# Patient Record
Sex: FEMALE | URBAN - METROPOLITAN AREA
[De-identification: names, ages, dates, MRNs, and addresses within clinical notes are randomized per-mention and may not be internally consistent; named-entity substitution may affect disease eponyms.]

---

## 2020-07-28 ENCOUNTER — APPOINTMENT (RX ONLY)
Dept: URBAN - METROPOLITAN AREA CLINIC 34 | Facility: CLINIC | Age: 35
Setting detail: DERMATOLOGY
End: 2020-07-28

## 2020-07-28 DIAGNOSIS — L65.9 NONSCARRING HAIR LOSS, UNSPECIFIED: ICD-10-CM

## 2020-07-28 DIAGNOSIS — Z41.9 ENCOUNTER FOR PROCEDURE FOR PURPOSES OTHER THAN REMEDYING HEALTH STATE, UNSPECIFIED: ICD-10-CM

## 2020-07-28 PROCEDURE — ? COSMETIC CONSULTATION: LHR

## 2020-07-28 PROCEDURE — 99202 OFFICE O/P NEW SF 15 MIN: CPT

## 2020-07-28 PROCEDURE — ? COUNSELING

## 2020-07-28 PROCEDURE — ? ORDER TESTS

## 2020-07-28 PROCEDURE — ? PRESCRIPTION

## 2020-07-28 PROCEDURE — ? ADDITIONAL NOTES

## 2020-07-28 RX ORDER — EFLORNITHINE HYDROCHLORIDE 139 MG/G
CREAM TOPICAL BID
Qty: 1 | Refills: 1 | Status: ERX | COMMUNITY
Start: 2020-07-28

## 2020-07-28 RX ORDER — KETOCONAZOLE 20 MG/ML
SHAMPOO, SUSPENSION TOPICAL
Qty: 1 | Refills: 2 | Status: ERX | COMMUNITY
Start: 2020-07-28

## 2020-07-28 RX ADMIN — EFLORNITHINE HYDROCHLORIDE: 139 CREAM TOPICAL at 00:00

## 2020-07-28 RX ADMIN — KETOCONAZOLE: 20 SHAMPOO, SUSPENSION TOPICAL at 00:00

## 2020-07-28 ASSESSMENT — LOCATION DETAILED DESCRIPTION DERM
LOCATION DETAILED: RIGHT LOWER CUTANEOUS LIP
LOCATION DETAILED: LEFT UPPER CUTANEOUS LIP

## 2020-07-28 ASSESSMENT — LOCATION SIMPLE DESCRIPTION DERM
LOCATION SIMPLE: LEFT LIP
LOCATION SIMPLE: RIGHT LIP

## 2020-07-28 ASSESSMENT — LOCATION ZONE DERM: LOCATION ZONE: LIP

## 2020-07-28 NOTE — PROCEDURE: ADDITIONAL NOTES
Detail Level: Simple
Additional Notes: Patient notes she tried OTC Rogaine with no improvement.\\nPatient denies current or history of  itching and pain on her scalp. Her father has receding hair loss and her mother has difficulty growing hair long.\\nPatient says her hair is thick, but it won’t grow past a certain length

## 2020-07-28 NOTE — HPI: HAIR LOSS
Previous Labs: No
How Did The Hair Loss Occur?: gradual in onset
How Severe Is Your Hair Loss?: moderate
What Hair Products Do You Use?: Brandin clarifying shampoo
Additional History: Patient notes she doesn’t notice hair loss but rather hair won’t grow.

## 2020-10-08 ENCOUNTER — RX ONLY (OUTPATIENT)
Age: 35
Setting detail: RX ONLY
End: 2020-10-08

## 2020-10-08 ENCOUNTER — APPOINTMENT (RX ONLY)
Dept: URBAN - METROPOLITAN AREA CLINIC 34 | Facility: CLINIC | Age: 35
Setting detail: DERMATOLOGY
End: 2020-10-08

## 2020-10-08 DIAGNOSIS — L74.51 PRIMARY FOCAL HYPERHIDROSIS: ICD-10-CM

## 2020-10-08 DIAGNOSIS — L65.9 NONSCARRING HAIR LOSS, UNSPECIFIED: ICD-10-CM

## 2020-10-08 PROBLEM — L74.513 PRIMARY FOCAL HYPERHIDROSIS, SOLES: Status: ACTIVE | Noted: 2020-10-08

## 2020-10-08 PROBLEM — L74.512 PRIMARY FOCAL HYPERHIDROSIS, PALMS: Status: ACTIVE | Noted: 2020-10-08

## 2020-10-08 PROCEDURE — 99213 OFFICE O/P EST LOW 20 MIN: CPT

## 2020-10-08 PROCEDURE — ? PRESCRIPTION

## 2020-10-08 PROCEDURE — ? COUNSELING

## 2020-10-08 PROCEDURE — ? ADDITIONAL NOTES

## 2020-10-08 RX ORDER — GLYCOPYRROLATE 1 MG/1
TABLET ORAL
Qty: 120 | Refills: 1 | Status: ERX | COMMUNITY
Start: 2020-10-08

## 2020-10-08 RX ORDER — KETOCONAZOLE 20 MG/ML
SHAMPOO, SUSPENSION TOPICAL
Qty: 1 | Refills: 2 | Status: ERX

## 2020-10-08 RX ADMIN — GLYCOPYRROLATE: 1 TABLET ORAL at 00:00

## 2020-10-08 ASSESSMENT — LOCATION SIMPLE DESCRIPTION DERM
LOCATION SIMPLE: RIGHT INDEX FINGER
LOCATION SIMPLE: LEFT HAND
LOCATION SIMPLE: RIGHT PLANTAR SURFACE
LOCATION SIMPLE: LEFT PLANTAR SURFACE

## 2020-10-08 ASSESSMENT — LOCATION ZONE DERM
LOCATION ZONE: HAND
LOCATION ZONE: FEET
LOCATION ZONE: FINGER

## 2020-10-08 ASSESSMENT — LOCATION DETAILED DESCRIPTION DERM
LOCATION DETAILED: RIGHT PROXIMAL PALMAR INDEX FINGER
LOCATION DETAILED: LEFT MEDIAL PLANTAR MIDFOOT
LOCATION DETAILED: LEFT ULNAR PALM
LOCATION DETAILED: RIGHT MEDIAL PLANTAR MIDFOOT

## 2020-10-08 NOTE — PROCEDURE: ADDITIONAL NOTES
Detail Level: Simple
Additional Notes: Patient is following up with pcp for thyroid abnormality. Gayle notes overall improvement in hair growth. Patient t says she noticed a small amount of hair growth. Patient says she is concerned about hair length, not hair thickness.

## 2021-04-12 ENCOUNTER — APPOINTMENT (RX ONLY)
Dept: URBAN - METROPOLITAN AREA CLINIC 34 | Facility: CLINIC | Age: 36
Setting detail: DERMATOLOGY
End: 2021-04-12

## 2021-04-12 DIAGNOSIS — F45.8 OTHER SOMATOFORM DISORDERS: ICD-10-CM

## 2021-04-12 PROCEDURE — 99213 OFFICE O/P EST LOW 20 MIN: CPT

## 2021-04-12 PROCEDURE — ? COUNSELING

## 2021-04-12 PROCEDURE — ? ADDITIONAL NOTES

## 2021-04-12 ASSESSMENT — LOCATION ZONE DERM
LOCATION ZONE: ARM
LOCATION ZONE: TRUNK

## 2021-04-12 ASSESSMENT — LOCATION DETAILED DESCRIPTION DERM
LOCATION DETAILED: LEFT INFERIOR MEDIAL UPPER BACK
LOCATION DETAILED: LEFT ANTECUBITAL SKIN
LOCATION DETAILED: UPPER STERNUM
LOCATION DETAILED: RIGHT VENTRAL PROXIMAL FOREARM

## 2021-04-12 ASSESSMENT — LOCATION SIMPLE DESCRIPTION DERM
LOCATION SIMPLE: LEFT UPPER ARM
LOCATION SIMPLE: CHEST
LOCATION SIMPLE: LEFT UPPER BACK
LOCATION SIMPLE: RIGHT FOREARM

## 2021-04-12 NOTE — PROCEDURE: ADDITIONAL NOTES
Detail Level: Simple
Additional Notes: Patient notes she feels bugs jumping/crawling on skin and patient has been itching for a month. Patient skin is clear. 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  Given patient on several anti psych meds would  her to discuss this with her psychiatrist as changing her meds could improve symptoms.   She cannot tolerate risperdal.  Could consider pimozide.   Already on Seroquel, tramadol, and buspar. \\n\\nAdvised can take oral antihistamines and continue otc cortisone cream. Patient reports she showers twice daily. Advised to reduce frequency to once a day as it can induce itching and contribute to this feeling.  No scrubbing with loofa or wash cloth.  \\n\\nIf lesions appear, advised to return to office for reevaluation.
Render Risk Assessment In Note?: no

## 2021-04-12 NOTE — HPI: RASH (ECZEMA)
How Severe Is Your Eczema?: moderate
Is This A New Presentation, Or A Follow-Up?: Rash
Additional History: Bedbugs

## 2021-05-06 ENCOUNTER — APPOINTMENT (RX ONLY)
Dept: URBAN - METROPOLITAN AREA CLINIC 34 | Facility: CLINIC | Age: 36
Setting detail: DERMATOLOGY
End: 2021-05-06

## 2021-05-06 DIAGNOSIS — Z41.9 ENCOUNTER FOR PROCEDURE FOR PURPOSES OTHER THAN REMEDYING HEALTH STATE, UNSPECIFIED: ICD-10-CM

## 2021-05-06 DIAGNOSIS — L81.0 POSTINFLAMMATORY HYPERPIGMENTATION: ICD-10-CM

## 2021-05-06 PROBLEM — L74.513 PRIMARY FOCAL HYPERHIDROSIS, SOLES: Status: ACTIVE | Noted: 2021-05-06

## 2021-05-06 PROBLEM — L74.512 PRIMARY FOCAL HYPERHIDROSIS, PALMS: Status: ACTIVE | Noted: 2021-05-06

## 2021-05-06 PROCEDURE — 99213 OFFICE O/P EST LOW 20 MIN: CPT

## 2021-05-06 PROCEDURE — ? COSMETIC CONSULTATION: LHR

## 2021-05-06 PROCEDURE — ? ADDITIONAL NOTES

## 2021-05-06 PROCEDURE — ? COUNSELING

## 2021-05-06 PROCEDURE — ? MEDICATION COUNSELING

## 2021-05-06 ASSESSMENT — LOCATION DETAILED DESCRIPTION DERM
LOCATION DETAILED: RIGHT ANTERIOR PROXIMAL THIGH
LOCATION DETAILED: LEFT ANTERIOR PROXIMAL THIGH
LOCATION DETAILED: LEFT ANTERIOR PROXIMAL THIGH
LOCATION DETAILED: RIGHT ANTERIOR PROXIMAL THIGH

## 2021-05-06 ASSESSMENT — LOCATION ZONE DERM
LOCATION ZONE: LEG
LOCATION ZONE: LEG

## 2021-05-06 ASSESSMENT — LOCATION SIMPLE DESCRIPTION DERM
LOCATION SIMPLE: RIGHT THIGH
LOCATION SIMPLE: LEFT THIGH
LOCATION SIMPLE: RIGHT THIGH
LOCATION SIMPLE: LEFT THIGH

## 2021-05-06 NOTE — PROCEDURE: MEDICATION COUNSELING
Xelezekielz Pregnancy And Lactation Text: This medication is Pregnancy Category D and is not considered safe during pregnancy.  The risk during breast feeding is also uncertain.

## 2021-05-06 NOTE — PROCEDURE: ADDITIONAL NOTES
Additional Notes: Patient notes glycopyrrolate was ineffective. Discussed Botox and associated risks. Patient declined treatment.
Render Risk Assessment In Note?: no
Detail Level: Simple
Additional Notes: Discussed Botox and associated risks. Patient declined treatment
Additional Notes: Discussed HQ as a treatment option. Patient declined treatment

## 2021-08-11 NOTE — PROCEDURE: MEDICATION COUNSELING
Niacinamide Pregnancy And Lactation Text: These medications are considered safe during pregnancy. Home

## 2023-09-30 ENCOUNTER — HOSPITAL ENCOUNTER (INPATIENT)
Facility: HOSPITAL | Age: 38
LOS: 4 days | Discharge: OTHER INSTITUTION | End: 2023-10-05
Attending: EMERGENCY MEDICINE | Admitting: PSYCHIATRY & NEUROLOGY
Payer: MEDICARE

## 2023-09-30 DIAGNOSIS — N30.00 ACUTE CYSTITIS WITHOUT HEMATURIA: ICD-10-CM

## 2023-09-30 DIAGNOSIS — F20.9 SCHIZOPHRENIA, UNSPECIFIED TYPE (HCC): Primary | ICD-10-CM

## 2023-09-30 LAB
COMMENT:: NORMAL
COMMENT:: NORMAL
SPECIMEN HOLD: NORMAL
SPECIMEN HOLD: NORMAL

## 2023-09-30 PROCEDURE — 99285 EMERGENCY DEPT VISIT HI MDM: CPT

## 2023-09-30 PROCEDURE — 36415 COLL VENOUS BLD VENIPUNCTURE: CPT

## 2023-09-30 PROCEDURE — 82077 ASSAY SPEC XCP UR&BREATH IA: CPT

## 2023-09-30 PROCEDURE — 80053 COMPREHEN METABOLIC PANEL: CPT

## 2023-09-30 PROCEDURE — 81001 URINALYSIS AUTO W/SCOPE: CPT

## 2023-09-30 PROCEDURE — 83735 ASSAY OF MAGNESIUM: CPT

## 2023-09-30 PROCEDURE — 85025 COMPLETE CBC W/AUTO DIFF WBC: CPT

## 2023-09-30 PROCEDURE — 80307 DRUG TEST PRSMV CHEM ANLYZR: CPT

## 2023-09-30 PROCEDURE — 84484 ASSAY OF TROPONIN QUANT: CPT

## 2023-09-30 PROCEDURE — 84702 CHORIONIC GONADOTROPIN TEST: CPT

## 2023-09-30 ASSESSMENT — PAIN SCALES - GENERAL: PAINLEVEL_OUTOF10: 9

## 2023-09-30 ASSESSMENT — PAIN - FUNCTIONAL ASSESSMENT: PAIN_FUNCTIONAL_ASSESSMENT: 0-10

## 2023-09-30 ASSESSMENT — PAIN DESCRIPTION - LOCATION: LOCATION: BACK

## 2023-10-01 ENCOUNTER — APPOINTMENT (OUTPATIENT)
Facility: HOSPITAL | Age: 38
End: 2023-10-01
Payer: MEDICARE

## 2023-10-01 PROBLEM — F20.0 PARANOID SCHIZOPHRENIA (HCC): Status: ACTIVE | Noted: 2023-10-01

## 2023-10-01 LAB
ALBUMIN SERPL-MCNC: 4 G/DL (ref 3.5–5)
ALBUMIN/GLOB SERPL: 1.1 (ref 1.1–2.2)
ALP SERPL-CCNC: 80 U/L (ref 45–117)
ALT SERPL-CCNC: 19 U/L (ref 12–78)
AMPHET UR QL SCN: NEGATIVE
ANION GAP SERPL CALC-SCNC: 5 MMOL/L (ref 5–15)
APPEARANCE UR: ABNORMAL
AST SERPL-CCNC: 9 U/L (ref 15–37)
BACTERIA URNS QL MICRO: ABNORMAL /HPF
BARBITURATES UR QL SCN: NEGATIVE
BASOPHILS # BLD: 0 K/UL (ref 0–0.1)
BASOPHILS NFR BLD: 0 % (ref 0–1)
BENZODIAZ UR QL: NEGATIVE
BILIRUB SERPL-MCNC: 0.5 MG/DL (ref 0.2–1)
BILIRUB UR QL: NEGATIVE
BUN SERPL-MCNC: 13 MG/DL (ref 6–20)
BUN/CREAT SERPL: 17 (ref 12–20)
CALCIUM SERPL-MCNC: 9.3 MG/DL (ref 8.5–10.1)
CANNABINOIDS UR QL SCN: POSITIVE
CAOX CRY URNS QL MICRO: ABNORMAL
CHLORIDE SERPL-SCNC: 106 MMOL/L (ref 97–108)
CO2 SERPL-SCNC: 24 MMOL/L (ref 21–32)
COCAINE UR QL SCN: NEGATIVE
COLOR UR: ABNORMAL
CREAT SERPL-MCNC: 0.78 MG/DL (ref 0.55–1.02)
CRL: 1.8 CM
CRL: 1.93 CM
CRL: 2.06 CM
DIFFERENTIAL METHOD BLD: ABNORMAL
EOSINOPHIL # BLD: 0.1 K/UL (ref 0–0.4)
EOSINOPHIL NFR BLD: 2 % (ref 0–7)
EPITH CASTS URNS QL MICRO: ABNORMAL /LPF
ERYTHROCYTE [DISTWIDTH] IN BLOOD BY AUTOMATED COUNT: 12.7 % (ref 11.5–14.5)
ETHANOL SERPL-MCNC: <10 MG/DL (ref 0–0.08)
GLOBULIN SER CALC-MCNC: 3.7 G/DL (ref 2–4)
GLUCOSE SERPL-MCNC: 90 MG/DL (ref 65–100)
GLUCOSE UR STRIP.AUTO-MCNC: NEGATIVE MG/DL
HCG SERPL-ACNC: ABNORMAL MIU/ML (ref 0–6)
HCT VFR BLD AUTO: 40.2 % (ref 35–47)
HGB BLD-MCNC: 13.2 G/DL (ref 11.5–16)
HGB UR QL STRIP: NEGATIVE
IMM GRANULOCYTES # BLD AUTO: 0 K/UL (ref 0–0.04)
IMM GRANULOCYTES NFR BLD AUTO: 0 % (ref 0–0.5)
KETONES UR QL STRIP.AUTO: 15 MG/DL
LEUKOCYTE ESTERASE UR QL STRIP.AUTO: ABNORMAL
LYMPHOCYTES # BLD: 1.7 K/UL (ref 0.8–3.5)
LYMPHOCYTES NFR BLD: 29 % (ref 12–49)
Lab: ABNORMAL
MAGNESIUM SERPL-MCNC: 1.9 MG/DL (ref 1.6–2.4)
MCH RBC QN AUTO: 26.2 PG (ref 26–34)
MCHC RBC AUTO-ENTMCNC: 32.8 G/DL (ref 30–36.5)
MCV RBC AUTO: 79.9 FL (ref 80–99)
METHADONE UR QL: NEGATIVE
MONOCYTES # BLD: 0.6 K/UL (ref 0–1)
MONOCYTES NFR BLD: 11 % (ref 5–13)
MUCOUS THREADS URNS QL MICRO: ABNORMAL /LPF
NEUTS SEG # BLD: 3.4 K/UL (ref 1.8–8)
NEUTS SEG NFR BLD: 58 % (ref 32–75)
NITRITE UR QL STRIP.AUTO: NEGATIVE
NRBC # BLD: 0 K/UL (ref 0–0.01)
NRBC BLD-RTO: 0 PER 100 WBC
OPIATES UR QL: NEGATIVE
PCP UR QL: NEGATIVE
PH UR STRIP: 6 (ref 5–8)
PLATELET # BLD AUTO: 301 K/UL (ref 150–400)
PMV BLD AUTO: 10.4 FL (ref 8.9–12.9)
POTASSIUM SERPL-SCNC: 3.6 MMOL/L (ref 3.5–5.1)
PROT SERPL-MCNC: 7.7 G/DL (ref 6.4–8.2)
PROT UR STRIP-MCNC: ABNORMAL MG/DL
RBC # BLD AUTO: 5.03 M/UL (ref 3.8–5.2)
RBC #/AREA URNS HPF: ABNORMAL /HPF (ref 0–5)
SAC DIAMETER: 3.93 CM
SAC DIAMETER: 3.93 CM
SARS-COV-2 RNA RESP QL NAA+PROBE: NOT DETECTED
SODIUM SERPL-SCNC: 135 MMOL/L (ref 136–145)
SOURCE: NORMAL
SP GR UR REFRACTOMETRY: 1.02
TROPONIN I SERPL HS-MCNC: <4 NG/L (ref 0–51)
UROBILINOGEN UR QL STRIP.AUTO: 1 EU/DL (ref 0.2–1)
WBC # BLD AUTO: 5.8 K/UL (ref 3.6–11)
WBC URNS QL MICRO: ABNORMAL /HPF (ref 0–4)

## 2023-10-01 PROCEDURE — 87635 SARS-COV-2 COVID-19 AMP PRB: CPT

## 2023-10-01 PROCEDURE — 6370000000 HC RX 637 (ALT 250 FOR IP): Performed by: EMERGENCY MEDICINE

## 2023-10-01 PROCEDURE — 1240000000 HC EMOTIONAL WELLNESS R&B

## 2023-10-01 PROCEDURE — 76801 OB US < 14 WKS SINGLE FETUS: CPT

## 2023-10-01 PROCEDURE — 6370000000 HC RX 637 (ALT 250 FOR IP): Performed by: PSYCHIATRY & NEUROLOGY

## 2023-10-01 RX ORDER — HALOPERIDOL 5 MG/ML
5 INJECTION INTRAMUSCULAR EVERY 4 HOURS PRN
Status: DISCONTINUED | OUTPATIENT
Start: 2023-10-01 | End: 2023-10-02

## 2023-10-01 RX ORDER — TRAZODONE HYDROCHLORIDE 50 MG/1
50 TABLET ORAL NIGHTLY PRN
Status: DISCONTINUED | OUTPATIENT
Start: 2023-10-01 | End: 2023-10-02

## 2023-10-01 RX ORDER — AMOXICILLIN AND CLAVULANATE POTASSIUM 875; 125 MG/1; MG/1
1 TABLET, FILM COATED ORAL
Status: COMPLETED | OUTPATIENT
Start: 2023-10-01 | End: 2023-10-01

## 2023-10-01 RX ORDER — LORAZEPAM 2 MG/ML
1 INJECTION INTRAMUSCULAR EVERY 6 HOURS PRN
Status: DISCONTINUED | OUTPATIENT
Start: 2023-10-01 | End: 2023-10-02

## 2023-10-01 RX ORDER — SENNOSIDES A AND B 8.6 MG/1
1 TABLET, FILM COATED ORAL DAILY PRN
Status: DISCONTINUED | OUTPATIENT
Start: 2023-10-01 | End: 2023-10-02

## 2023-10-01 RX ORDER — POLYETHYLENE GLYCOL 3350 17 G/17G
17 POWDER, FOR SOLUTION ORAL DAILY PRN
Status: DISCONTINUED | OUTPATIENT
Start: 2023-10-01 | End: 2023-10-05 | Stop reason: HOSPADM

## 2023-10-01 RX ORDER — ACETAMINOPHEN 325 MG/1
650 TABLET ORAL EVERY 4 HOURS PRN
Status: DISCONTINUED | OUTPATIENT
Start: 2023-10-01 | End: 2023-10-05 | Stop reason: HOSPADM

## 2023-10-01 RX ORDER — MAGNESIUM HYDROXIDE/ALUMINUM HYDROXICE/SIMETHICONE 120; 1200; 1200 MG/30ML; MG/30ML; MG/30ML
30 SUSPENSION ORAL EVERY 6 HOURS PRN
Status: DISCONTINUED | OUTPATIENT
Start: 2023-10-01 | End: 2023-10-05 | Stop reason: HOSPADM

## 2023-10-01 RX ORDER — HALOPERIDOL 5 MG/1
5 TABLET ORAL EVERY 4 HOURS PRN
Status: DISCONTINUED | OUTPATIENT
Start: 2023-10-01 | End: 2023-10-02

## 2023-10-01 RX ADMIN — AMOXICILLIN AND CLAVULANATE POTASSIUM 1 TABLET: 875; 125 TABLET, FILM COATED ORAL at 04:08

## 2023-10-01 RX ADMIN — TRAZODONE HYDROCHLORIDE 50 MG: 50 TABLET ORAL at 21:35

## 2023-10-01 ASSESSMENT — LIFESTYLE VARIABLES
HOW OFTEN DO YOU HAVE A DRINK CONTAINING ALCOHOL: MONTHLY OR LESS
HOW MANY STANDARD DRINKS CONTAINING ALCOHOL DO YOU HAVE ON A TYPICAL DAY: 1 OR 2

## 2023-10-01 ASSESSMENT — SLEEP AND FATIGUE QUESTIONNAIRES
DO YOU HAVE DIFFICULTY SLEEPING: NO
AVERAGE NUMBER OF SLEEP HOURS: 12
DO YOU USE A SLEEP AID: NO

## 2023-10-01 ASSESSMENT — PAIN SCALES - GENERAL
PAINLEVEL_OUTOF10: 5
PAINLEVEL_OUTOF10: 0
PAINLEVEL_OUTOF10: 7
PAINLEVEL_OUTOF10: 0

## 2023-10-01 ASSESSMENT — ENCOUNTER SYMPTOMS
SHORTNESS OF BREATH: 0
ABDOMINAL PAIN: 1
EYE DISCHARGE: 0

## 2023-10-01 ASSESSMENT — PAIN DESCRIPTION - LOCATION
LOCATION: HEAD
LOCATION: BACK;HIP

## 2023-10-01 ASSESSMENT — PAIN DESCRIPTION - PAIN TYPE
TYPE: CHRONIC PAIN
TYPE: ACUTE PAIN

## 2023-10-01 ASSESSMENT — PAIN DESCRIPTION - DESCRIPTORS
DESCRIPTORS: ACHING
DESCRIPTORS: DULL

## 2023-10-01 ASSESSMENT — PAIN DESCRIPTION - ORIENTATION
ORIENTATION: RIGHT;LEFT
ORIENTATION: ANTERIOR

## 2023-10-01 NOTE — BSMART NOTE
Comprehensive Assessment Form Part 1      Section I - Disposition    Primary Diagnosis: f20.0 Paranoid Schizophrenia ( Historical)   Secondary Diagnosis:     The Medical Doctor to Psychiatrist conference was notcompleted. The Medical Doctor is in agreement with Psychiatrist disposition because of (reason) n/a. The plan is Voluntary Hospitalization . The on-call Psychiatrist consulted was Dr. Gui Knox. The admitting Psychiatrist will be Dr. Aniket Sanderson. The admitting Diagnosis is  f20.0 Paranoid Schizophrenia. The Payor source is: No insurance listed . The name of the representative was self . This writer reviewed the [facility] in nursing flowsheet and the risk level assigned is high risk. Based on this assessment, the risk of suicide is moderate risk due to historical attempt. Patient is currently endorsing passive suicidal ideation and the plan is voluntary hospitalization. Section II - Integrated Summary    Summary:  An assessment was conducted via face to face within the hospital ER, in attendance was this writer and Sharita Rizzo. At the start of the assessment it was determined that Linnea Ivy was oriented x4 and willing to participate in the assessment. Linnea Ivy began the assessment by expressing to this writer that for several weeks she has been experiencing bout of confusion with an inability to concentrate. Linnea Ivy reports that she will experience command auditory hallucinations along with visual hallucinations that interfere with her ability to concentrate and express thought at times. Joslyn reported that the command auditory hallucinations will present as voices that will instruct her to do tasks or provide guidance on what to say and warn her of evil and good people. Linnea Ivy stated that the warnings she receives about people will manifest as visions and images that allow her to see things eminating from certain people.  Due to these visions/warnings, Joslyn expressed to this is unkept. The patient's behavior is guarded and shows poor eye contact. The patient is oriented to time, place, person and situation. The patient's speech presents as soft and childlike  then will become clear with normal range and tone then revert back to soft and childlike. The patient's mood is sad. The range of affect shows no evidence of impairment. The patient's thought content demonstrates delusions and paranoia . The thought process is blocking. The patient's perception demonstrated changes in the following: command auditory hallucinations and  visual hallucinations. The patient's memory is impaired and patient reports feeling confused at times and struggles to concentrate. The patient's appetite is decreased and shows signs of weight loss . The patient's sleep has evidence of hypersomnia. The patient's insight shows no evidence of impairment. The patient's judgement shows no evidence of impairment. Section V - Substance Abuse  The patient is  using substances. The patient is using alcohol for an unreported amount of time with last use on reported 3 weeks ago, cannabis smoked forfor an unreported amount of time with last use on reported 3 weeks ago  cocaine  smoked for an unreported amount of time with last use on reported 3 weeks ago other opiates smoked  for for an unreported amount of time with last use on reported 3 weeks ago and other substances ( PCP)  smoked for an unreported amount of time with last use on reported 3 weeks agoThe patient has experienced the following withdrawal symptoms: N/A patient does not report withdraw symptoms     Section VI - Living Arrangements  The patient Single. The patient lives with a parent. The patient has 2 children, ages 8 and  7 months  . The patient does plan to return home upon discharge. The patient does not have legal issues pending. The patient's source of income comes from disability.   Buddhist and cultural practices have not been voiced

## 2023-10-01 NOTE — PROGRESS NOTES
Pt ambulated to restroom x2 with PCT. Pt provided with [place] and GingerAle per request.  Steady gait noted as pt ambulated.

## 2023-10-01 NOTE — PROGRESS NOTES
TRANSFER - OUT REPORT:    Verbal report given to Rosi Boo on Malini Hammond  being transferred to [address] [place] [address] for routine progression of patient care       Report consisted of patient's Situation, Background, Assessment and   Recommendations(SBAR). Information from the following report(s) Nurse Handoff Report, ED Encounter Summary, Adult Overview, Intake/Output, Recent Results, and Event Log was reviewed with the receiving nurse. [facility] Fall Assessment:                           Lines:       Opportunity for questions and clarification was provided.       Patient transported with:  Tech  Pt belongings bags x 2  Security

## 2023-10-01 NOTE — PROGRESS NOTES
Bedside and Verbal shift change received from MIRNA Wade, report included the following information Nurse Handoff Report, ED Encounter Summary, Adult Overview, Recent Results, and Event Log. Pt resting comfortably on stretcher.

## 2023-10-01 NOTE — PLAN OF CARE
Pt refuses to complete the OQ questionnaire on admission. Problem: Anxiety  Goal: Will report anxiety at manageable levels  Description: INTERVENTIONS:  1. Administer medication as ordered  2. Teach and rehearse alternative coping skills  3. Provide emotional support with 1:1 interaction with staff  Outcome: Progressing     Problem: Coping  Goal: Pt/Family able to verbalize concerns and demonstrate effective coping strategies  Description: INTERVENTIONS:  1. Assist patient/family to identify coping skills, available support systems and cultural and spiritual values  2. Provide emotional support, including active listening and acknowledgement of concerns of patient and caregivers  3. Reduce environmental stimuli, as able  4. Instruct patient/family in relaxation techniques, as appropriate  5. Assess for spiritual pain/suffering and initiate Spiritual Care, Psychosocial Clinical Specialist consults as needed  Outcome: Progressing     Problem: Behavior  Goal: Pt/Family maintain appropriate behavior and adhere to behavioral management agreement, if implemented  Description: INTERVENTIONS:  1. Assess patient/family's coping skills and  non-compliant behavior (including use of illegal substances)  2. Notify security of behavior or suspected illegal substances which indicate the need for search of the family and/or belongings  3. Encourage verbalization of thoughts and concerns in a socially appropriate manner  4. Utilize positive, consistent limit setting strategies supporting safety of patient, staff and others  5. Encourage participation in the decision making process about the behavioral management agreement  6. If a visitor's behavior poses a threat to safety call refer to organization policy. 7. Initiate consult with , Psychosocial CNS, Spiritual Care as appropriate  Outcome: Progressing  Note: Pt is alert and oriented. Guarded and evasive during admission process. Selective answering questions.

## 2023-10-01 NOTE — ED TRIAGE NOTES
Discussed urinary tract infection with patient. Patient reports she is not having the urinary frequency. Agreeable with plan for antibiotics. Medically cleared for psychiatric admission.

## 2023-10-01 NOTE — ED NOTES
Patient report given to [facility] RN using SBAR and MAR.       [address]  10/93/45 [id number]

## 2023-10-01 NOTE — PROGRESS NOTES
Report given [place] Shrey[place]oussef [place] [id number] [facility] [id number]. SBAR Report included VS, pt history, pertinent labs and pt information. All questions/concerns were answered.

## 2023-10-01 NOTE — BSMART NOTE
Patient has been accepted to [facility] 736/01 by Carlitos Chavira NP on behalf of Dr Nette Marie. RN notified to call report to ext [phone].      Kasandra Baer LCSW

## 2023-10-01 NOTE — ED TRIAGE NOTES
Pt to be eval for SI w/no plan and denies HI. [place] after living with her father; allegedly she has a small child that she is unsure of her whereabouts. Pt endorses auditory and visual hallucinations. Pt has had psych admissions in the past; [place]. Pt evasive with answering questions; but attempting to answering appropriately.  Pt was at [facility] but left d/t poor service

## 2023-10-01 NOTE — BSMART NOTE
BSMART assessment completed, and suicide risk level noted to be Moderate Risk due to historical attempt . Primary Nurse Samia Rey and Charge Nurse [address] [place] [address] and Physician MD. Saunders notified. Concerns not observed. Security/Off- has not been notified.

## 2023-10-01 NOTE — ED NOTES
Patient report received from [facility] using [facility] and SBAR.  KIET Beatty, SANDRAN  [phone]

## 2023-10-01 NOTE — BH NOTE
TRANSFER - IN REPORT:    Verbal report received from Portia Gabriel on Lluvia Gorman  being received from [facility] ED for routine progression of patient care      Report consisted of patient's Situation, Background, Assessment and   Recommendations(SBAR). Information from the following report(s) ED Encounter Summary was reviewed with the receiving nurse. Opportunity for questions and clarification was provided. Assessment completed upon patient's arrival to unit and care assumed. Voluntary admission from [facility] ED. Command hallucinations. AVH. Hx SA 10 years ago. Positive hCG. Off medications; denies out patient services. 4 Eyes Skin Assessment     NAME:  Lluvia Gorman  YOB: 1985  MEDICAL RECORD NUMBER:  [id number]    The patient is being assessed for  Admission    I agree that at least one RN has performed a thorough Head to Toe Skin Assessment on the patient. ALL assessment sites listed below have been assessed. Areas assessed by both nurses:            Does the Patient have a Wound?  No noted wound(s)       Nimseh Prevention initiated by RN: Yes  Wound Care Orders initiated by RN: No    Pressure Injury (Stage 3,4, Unstageable, DTI, NWPT, and Complex wounds) if present, place Wound referral order by RN under : No    New Ostomies, if present place, Ostomy referral order under : No     Nurse 1 eSignature: Electronically signed by Christelle Hodges on 10/1/23 at 11:43 AM EDT    **SHARE this note so that the co-signing nurse can place an [employer]**    Nurse 2 eSignature:  Cha Dias RN

## 2023-10-02 PROCEDURE — 1240000000 HC EMOTIONAL WELLNESS R&B

## 2023-10-02 PROCEDURE — 6370000000 HC RX 637 (ALT 250 FOR IP): Performed by: NURSE PRACTITIONER

## 2023-10-02 RX ORDER — AMOXICILLIN AND CLAVULANATE POTASSIUM 875; 125 MG/1; MG/1
1 TABLET, FILM COATED ORAL EVERY 12 HOURS SCHEDULED
Status: DISCONTINUED | OUTPATIENT
Start: 2023-10-02 | End: 2023-10-02

## 2023-10-02 RX ORDER — TRAZODONE HYDROCHLORIDE 50 MG/1
50 TABLET ORAL NIGHTLY PRN
Status: DISCONTINUED | OUTPATIENT
Start: 2023-10-02 | End: 2023-10-05 | Stop reason: HOSPADM

## 2023-10-02 RX ORDER — AMOXICILLIN AND CLAVULANATE POTASSIUM 875; 125 MG/1; MG/1
1 TABLET, FILM COATED ORAL EVERY 12 HOURS SCHEDULED
Status: DISCONTINUED | OUTPATIENT
Start: 2023-10-02 | End: 2023-10-05 | Stop reason: HOSPADM

## 2023-10-02 RX ORDER — LORAZEPAM 2 MG/ML
1 INJECTION INTRAMUSCULAR EVERY 6 HOURS PRN
Status: DISCONTINUED | OUTPATIENT
Start: 2023-10-02 | End: 2023-10-05 | Stop reason: HOSPADM

## 2023-10-02 RX ORDER — HALOPERIDOL 5 MG/ML
5 INJECTION INTRAMUSCULAR EVERY 4 HOURS PRN
Status: DISCONTINUED | OUTPATIENT
Start: 2023-10-02 | End: 2023-10-05 | Stop reason: HOSPADM

## 2023-10-02 RX ORDER — HALOPERIDOL 5 MG/1
5 TABLET ORAL EVERY 4 HOURS PRN
Status: DISCONTINUED | OUTPATIENT
Start: 2023-10-02 | End: 2023-10-05 | Stop reason: HOSPADM

## 2023-10-02 RX ADMIN — AMOXICILLIN AND CLAVULANATE POTASSIUM 1 TABLET: 875; 125 TABLET, FILM COATED ORAL at 20:45

## 2023-10-02 ASSESSMENT — PAIN SCALES - GENERAL: PAINLEVEL_OUTOF10: 0

## 2023-10-02 NOTE — BH NOTE
PRN Medication Documentation    Specific patient behavior that led to need for PRN medication: Insomnia  Staff interventions attempted prior to PRN being given: Breathing exercises   PRN medication given: Trazodone 50mg  Patient response/effectiveness of PRN medication: Ongoing monitoring

## 2023-10-02 NOTE — PLAN OF CARE
Problem: Anxiety  Goal: Will report anxiety at manageable levels  Description: INTERVENTIONS:  1. Administer medication as ordered  2. Teach and rehearse alternative coping skills  3. Provide emotional support with 1:1 interaction with staff  10/1/2023 9639 by Los Lala RN  Outcome: Progressing    Patient appears to be resting comfortably in bed. There are no signs of distress and respirations are even and unlabored. Patient is being monitored q.15 min for safety.

## 2023-10-02 NOTE — BH NOTE
GROUP THERAPY PROGRESS NOTE    Patient is participating in Safety Planning group. Group time: 15-30 minutes    Personal goal for participation: To complete safety plan     Goal orientation: Personal    Group therapy participation: Passive      Therapeutic interventions reviewed and discussed:  Group members were supported in filling out safety plans. Pts are able to develop and document a strategy to remain safe after discharge. SW provided feedback about questions/concerns of pts. Pts returned safety plans when completed. Impression of participation:  SW provided safety plan to pt to be completed independently.     AYO Culp, HP-A

## 2023-10-02 NOTE — H&P
[address] [facility]  INITIAL PSYCHIATRIC INTERVIEW:    Name: Alee Sales  MR#: [id number]  ACCOUNT#: [de-identified]  : 1985  ADMIT DATE: 2023    CHIEF COMPLAINT: \"I don't feel well. \"     HISTORY OF PRESENTING COMPLAINT:  This is a 41 y.o. female who is currently admitted to the acute psychiatric floor at [facility] on a voluntary basis for suicidal ideation in the context of altered mental status. She has reported auditory hallucinations, command in nature, increased confusion, and paranoia. She has been sleeping and eating erratically. During evaluation, the pt was resting in her bed with the covers fully over her head. She exhibited irritability and refused treatment team. We came to her, and she stated she didn't feel well, and would not respond to any further questions, even about medications or substance use. Information below comes from chart review, will attempt to obtain more information and collateral tomorrow. It was documented from the ED that the pt is not currently on medications. Of note, the pt is currently pregnant, apparently in the first trimester. PAST PSYCHIATRIC HISTORY: The pt has a hx of paranoid schizophrenia. The pt has a hx of one siucide attempt about 10 years ago as well as self-injurious behavior by cutting. PAST MEDICATION TRIALS: Depakote, Wellbutrin, Latuda, Zyprexa, trazodone, Abilify Maintena, Seroquel, Ativan, Prolixin, lithium, Risperdal, Haldol, Suboxone    SUBSTANCE ABUSE HISTORY:  The pt refused to disclose any substance abuse hx. From the chart, the pt's urine was positive for [facility] and there is a hx of taking suboxone, last filled in 2022. PSYCHOSOCIAL HISTORY: The pt has 2 children, ages 8 and 6 months.      FAMILY HISTORY: Unknown    PAST MEDICAL HISTORY:   Past Medical History:   Diagnosis Date    Gestational diabetes      ALLERGIES: NKDA    MENTAL STATUS EXAM:   41year old  female lying on the bed

## 2023-10-02 NOTE — BH NOTE
Behavioral Health Interdisciplinary Rounds     Patient Name: Eder Delaney  Age: 41 y.o. Room/Bed:  736/02  Primary Diagnosis: Paranoid schizophrenia ([address])   Admission Status: Voluntary    Readmission within 30 days: No  Power of  in place: No  Patient requires a blocked bed: No          Reason for blocked bed:   Sleep hours: 4       Participation in Care/Groups:  N/A New Admission  Medication Compliant?: Yes  PRNS (last 24 hours): Sleep Aid   Restraints (last 24 hours):  No  __________________________________________________  OQ Admission Analysis Survey completed:  OQ Admission Analysis Survey score:  __________________________________________________     Alcohol screening (AUDIT) completed -     If applicable, date SBIRT discussed in treatment team AND documented:    Tobacco - patient is a smoker:    Illegal Drugs use:      24 hour chart check complete: Yes    _______________________________________________    Patient goal(s) for today:   Treatment team focus/goals:   Progress note: Patient met with treatment team for the first time since admission. Patient minimally engaged in conversation, patient laid in bed with blanket covering her face for entirety of meeting and stated \"I have my reasons for being here, I don't feel well and I don't want to talk anymore. Foye Elijah Foye Elijah \" when asked follow up questions patient did not respond. Plan to assess tomorrow.      Spiritual Care Consult:   Financial concerns/prescription coverage:    Family contact:                        Family requesting physician contact today:    Discharge plan:   Access to weapons :                                                              Outpatient provider(s):     LOS:  0  Expected LOS: TBD    Participating treatment team members: AYO Winters, Marielle Gutierrez RN, Pastor Tone NP, Aby HidalgoD

## 2023-10-02 NOTE — BH NOTE
PSYCHOSOCIAL ASSESSMENT  :Patient identifying info:   Anitha Eason is a 41 y.o., female admitted 2023 11:12 PM     Presenting problem and precipitating factors: Pt reported to ED due to confusion, inability to concentrate, command AH and VH. Pt reports that [employer] are causing her great stress SI, w/o plan and HI \"to protect the good\". Pt did reportd hx of SI with attempt 10 years ago. Pt reports sleep and appetite disturbance. Mental status assessment:     Strengths/Recreation/Coping Skills:    Collateral information:     Current psychiatric /substance abuse providers and contact info: no current psych provider    Previous psychiatric/substance abuse providers and response to treatment: Pt did report admission hx. Family history of mental illness or substance abuse: unknown     Substance abuse history:  ETOH, THC, Opiates, PCP  Social History     Tobacco Use    Smoking status: Unknown     Passive exposure: Past    Smokeless tobacco: Never    Tobacco comments:     Pt refuses questions and education   Substance Use Topics    Alcohol use: Yes     Comment: pt refuses questions       History of biomedical complications associated with substance abuse: none    Patient's current acceptance of treatment or motivation for change: voluntary     Family constellation: Single, 2 children (10 and 6 months)    Is significant other involved?  No     Describe support system: Father, Ivan Bee    Describe living arrangements and home environment: Pt lives with parent     GUARDIAN/POA: No    Guardian Name:     Guardian Contact:     Health issues:     Trauma history: yes    Legal issues: no    History of  service: no    Financial status: unknown     Worship/cultural factors: not reported     Education/work history: Disability     Have you been licensed as a health care professional (current or ): no     Describe coping skills: Ineffectual    Christian Ballard  10/2/2023

## 2023-10-02 NOTE — PLAN OF CARE
Pt is isolated and withdrawn to her room. Engages very little, if at all with staff except to get her food tray. Pt refused to speak to Daksha Torres, NP,  and nurse, saying \"I don't feel good\" multiple times. Meal compliant, flat affect, depressed mood, not interactive. Problem: Anxiety  Goal: Will report anxiety at manageable levels  Description: INTERVENTIONS:  1. Administer medication as ordered  2. Teach and rehearse alternative coping skills  3. Provide emotional support with 1:1 interaction with staff  Outcome: Progressing     Problem: Coping  Goal: Pt/Family able to verbalize concerns and demonstrate effective coping strategies  Description: INTERVENTIONS:  1. Assist patient/family to identify coping skills, available support systems and cultural and spiritual values  2. Provide emotional support, including active listening and acknowledgement of concerns of patient and caregivers  3. Reduce environmental stimuli, as able  4. Instruct patient/family in relaxation techniques, as appropriate  5.  Assess for spiritual pain/suffering and initiate Spiritual Care, Psychosocial Clinical Specialist consults as needed  Outcome: Progressing

## 2023-10-03 PROCEDURE — 6370000000 HC RX 637 (ALT 250 FOR IP): Performed by: NURSE PRACTITIONER

## 2023-10-03 PROCEDURE — 1240000000 HC EMOTIONAL WELLNESS R&B

## 2023-10-03 PROCEDURE — 6370000000 HC RX 637 (ALT 250 FOR IP): Performed by: PSYCHIATRY & NEUROLOGY

## 2023-10-03 RX ADMIN — AMOXICILLIN AND CLAVULANATE POTASSIUM 1 TABLET: 875; 125 TABLET, FILM COATED ORAL at 09:22

## 2023-10-03 RX ADMIN — AMOXICILLIN AND CLAVULANATE POTASSIUM 1 TABLET: 875; 125 TABLET, FILM COATED ORAL at 20:31

## 2023-10-03 RX ADMIN — ACETAMINOPHEN 650 MG: 325 TABLET ORAL at 13:05

## 2023-10-03 ASSESSMENT — PAIN DESCRIPTION - DESCRIPTORS: DESCRIPTORS: ACHING

## 2023-10-03 ASSESSMENT — PAIN SCALES - GENERAL
PAINLEVEL_OUTOF10: 0
PAINLEVEL_OUTOF10: 9

## 2023-10-03 ASSESSMENT — PAIN DESCRIPTION - LOCATION: LOCATION: HEAD

## 2023-10-03 NOTE — BH NOTE
GROUP THERAPY PROGRESS NOTE  Topic: Recreation   No group due to low patient participation or acuity. SW provided handouts for pts to work on independently.   Melissa Maloney, AYO, HP-A

## 2023-10-03 NOTE — PLAN OF CARE
Problem: Behavior  Goal: Pt/Family maintain appropriate behavior and adhere to behavioral management agreement, if implemented  Description: INTERVENTIONS:  1. Assess patient/family's coping skills and  non-compliant behavior (including use of illegal substances)  2. Notify security of behavior or suspected illegal substances which indicate the need for search of the family and/or belongings  3. Encourage verbalization of thoughts and concerns in a socially appropriate manner  4. Utilize positive, consistent limit setting strategies supporting safety of patient, staff and others  5. Encourage participation in the decision making process about the behavioral management agreement  6. If a visitor's behavior poses a threat to safety call refer to organization policy. 7. Initiate consult with , Psychosocial CNS, Spiritual Care as appropriate  10/3/2023 1742 by Hui Marrero RN  Outcome: Not Progressing  10/3/2023 9849 by Traci William RN  Outcome: Not Progressing    Patient has been intermittently visible on the unit with minimal interactions with peers. She presents with an overly-bright affect and is pleasant and cooperative on approach. She continues to have inappropriate laughter and bizarre interactions with staff. Pt was able to shower independently shortly after dinner, and then pt had a witnessed episode of emesis. She was offered support, but declined need for medication/intervention. Will continue to monitor for further changes. Q15m rounds for safety continued per provider order.

## 2023-10-03 NOTE — PLAN OF CARE
Problem: Coping  Goal: Pt/Family able to verbalize concerns and demonstrate effective coping strategies  Description: INTERVENTIONS:  1. Assist patient/family to identify coping skills, available support systems and cultural and spiritual values  2. Provide emotional support, including active listening and acknowledgement of concerns of patient and caregivers  3. Reduce environmental stimuli, as able  4. Instruct patient/family in relaxation techniques, as appropriate  5. Assess for spiritual pain/suffering and initiate Spiritual Care, Psychosocial Clinical Specialist consults as needed  10/3/2023 9825 by Chon Westbrook RN  Outcome: Not Progressing  10/2/2023 2344 by Chelsea Levy RN  Flowsheets (Taken 10/2/2023 2344)  Patient/family able to verbalize anxieties, fears, and concerns, and demonstrate effective coping:   Assist patient/family to identify coping skills, available support systems and cultural and spiritual values   Reduce environmental stimuli, as able   Provide emotional support, including active listening and acknowledgement of concerns of patient and caregivers   Instruct patient/family in relaxation techniques, as appropriate  10/2/2023 1839 by Tanner Magallon RN  Outcome: Progressing     Problem: Coping  Goal: Pt/Family able to verbalize concerns and demonstrate effective coping strategies  Description: INTERVENTIONS:  1. Assist patient/family to identify coping skills, available support systems and cultural and spiritual values  2. Provide emotional support, including active listening and acknowledgement of concerns of patient and caregivers  3. Reduce environmental stimuli, as able  4. Instruct patient/family in relaxation techniques, as appropriate  5.  Assess for spiritual pain/suffering and initiate Spiritual Care, Psychosocial Clinical Specialist consults as needed  10/3/2023 9632 by Chon Westbrook RN  Outcome: Not Progressing  10/2/2023 2344 by Chelsea Levy RN  Flowsheets (Taken 10/2/2023

## 2023-10-03 NOTE — BH NOTE
GROUP THERAPY PROGRESS NOTE    Patient is participating in [address] [place] [address] and Wellness group. Group time: 45 minutes    Personal goal for participation: To develop an understanding of [employer]. Goal orientation: Personal    Group therapy participation:  Passive    Therapeutic interventions reviewed and discussed:  Group members were offered education about [employer]. Members learned about the three aspects of Health, physical, mental, and social. Members were provided with a handout assessment so that they may gain a visual understanding of balanced and unbalanced health maintenance. Impression of participation: Sw provided handouts for pts to complete independently due to low participation.      AYO Murray, [facility]-A

## 2023-10-03 NOTE — BH NOTE
[facility]  PSYCHIATRIC PROGRESS NOTE    Patient: Giovanna Crocker MRN: [id number]  SSN: [id number]    YOB: 1985  Age: 41 y.o. Sex: female      Admit Date: 9/30/2023    Length of Stay: 2 Days    Chief Complaint: \"I want to hurt people who sound like you. \"     Interval History:   10/3/23- Rosy Zamudio is exhibiting notable irritability today. She appears angry at all of my questions, talking in a sarcastic voice, interrupting virtually every statement made by the treatment team. It was virtually impossible to make a complete sentence without the pt interrupting and laughing. She speaks in a very high pitched voice. She denies SI. The pt exhibited notable frustration with me on multiple occasions; I attempted to elicit from the pt how we can help her while she's in the hospital, and her response was \"food, housing. \" I asked more specifically what her mental health goals are, and she stated \"I want to rebuild my character. \" I advised that this is a commendable goal, and is likely something that will be accomplished with intensive therapy at the outpatient level. The pt scoffed at this and accused me of trying to get rid of her. I advised that my goal is to understand how we can support her with her mental health needs while she is here in the hospital, and she laughed at this question and shook her head. She states she does not want to take medications at this time. I inquired how else we can be of support to her in that case, since we are unable to provide patients with long-term housing or long-term individual therapy, and she said she would like a program that will provide long-term housing and mental health treatment. I advised that given her insurance situation, [facility] is likely going to be the only option for a residential program, and after learning about what they offer, she declined that option and stated \"what other options do you have for me? \" I advised I'd have to check with social

## 2023-10-03 NOTE — INTERDISCIPLINARY ROUNDS
Behavioral Health Interdisciplinary Rounds     Patient Name: Giovanna Crocker  Age: 41 y.o. Room/Bed:  736/02  Primary Diagnosis: Paranoid schizophrenia ([address])   Admission Status:  TDO       Readmission within 30 days: no  Power of  in place: no  Patient requires a blocked bed:  yes           Reason for blocked bed: behavior      Flu Vaccine :    Consults:          Labs/Testing due today? Have they refused labs?: no    Sleep hours:          Participation in Care/Groups:  no  Medication Compliant?:  yes  PRNS (last 24 hours):     Restraints (last 24 hours):  no     CIWA (range last 24 hours):     COWS (range last 24 hours):      Alcohol screening (AUDIT) completed -         If applicable, date SBIRT discussed in treatment team AND documented:   AUDIT Screen Score:        Document Brief Intervention (corresponds directly with the 5 A's, Ask, Advise, Assess, Assist, and Arrange): At- Risk Patients (Score 7-15 for women; 8-15 for men)  Discuss concern patient is drinking at unhealthy levels known to increase risk of alcohol-related health problems. Is Patient ready to commit to change? If No:  Encourage reflection  Discuss short term and long term health risks of consuming alcohol  Barriers to change  Reaffirm willingness to help / Educational materials provided  If Yes:  Set goal  Plan  Educational materials provided    Harmful use or Dependence (Score 16 or greater)  Discuss short term and long term health risks of consuming alcohol  Recommendations  Negotiate drinking goal  Recommend addiction specialist/center  Arrange follow-up appointments.     Tobacco - patient is a smoker:    Illegal Drugs use:      24 hour chart check complete: yes   ____________________________________________________________________________________________________________    Patient goal(s) for today:   Treatment team focus/goals:   Progress note     LOS:  2        Outpatient provider(s):   Patient's preferred phone number for follow up call :   Patient's preferred e-mail address :  Participating treatment team members: Malini Hammond, * (assigned SW),

## 2023-10-03 NOTE — BH NOTE
GROUP THERAPY PROGRESS NOTE    Patient did not participate in psychotherapy group.     Alessia Gordon MSW, [facility]-A

## 2023-10-03 NOTE — PLAN OF CARE
Problem: Anxiety  Goal: Will report anxiety at manageable levels  10/2/2023 2344 by Bob Woodson RN  Flowsheets (Taken 10/2/2023 2344)  Will report anxiety at manageable levels:   Administer medication as ordered   Provide emotional support with 1:1 interaction with staff   Teach and rehearse alternative coping skills  Outcome: Progressing     Problem: Coping  Goal: Pt/Family able to verbalize concerns and demonstrate effective coping strategies  10/2/2023 2344 by Bob Woodson RN  Flowsheets (Taken 10/2/2023 2344)  Patient/family able to verbalize anxieties, fears, and concerns, and demonstrate effective coping:   Assist patient/family to identify coping skills, available support systems and cultural and spiritual values   Reduce environmental stimuli, as able   Provide emotional support, including active listening and acknowledgement of concerns of patient and caregivers   Instruct patient/family in relaxation techniques, as appropriate  Outcome: Progressing

## 2023-10-04 LAB
EKG ATRIAL RATE: 80 BPM
EKG DIAGNOSIS: NORMAL
EKG P AXIS: 78 DEGREES
EKG P-R INTERVAL: 136 MS
EKG Q-T INTERVAL: 368 MS
EKG QRS DURATION: 70 MS
EKG QTC CALCULATION (BAZETT): 424 MS
EKG R AXIS: 29 DEGREES
EKG T AXIS: 49 DEGREES
EKG VENTRICULAR RATE: 80 BPM

## 2023-10-04 PROCEDURE — 1240000000 HC EMOTIONAL WELLNESS R&B

## 2023-10-04 PROCEDURE — 6370000000 HC RX 637 (ALT 250 FOR IP): Performed by: NURSE PRACTITIONER

## 2023-10-04 RX ORDER — AMOXICILLIN AND CLAVULANATE POTASSIUM 875; 125 MG/1; MG/1
1 TABLET, FILM COATED ORAL EVERY 12 HOURS SCHEDULED
Qty: 4 TABLET | Refills: 0 | Status: SHIPPED | OUTPATIENT
Start: 2023-10-04 | End: 2023-10-06

## 2023-10-04 RX ADMIN — AMOXICILLIN AND CLAVULANATE POTASSIUM 1 TABLET: 875; 125 TABLET, FILM COATED ORAL at 09:54

## 2023-10-04 RX ADMIN — AMOXICILLIN AND CLAVULANATE POTASSIUM 1 TABLET: 875; 125 TABLET, FILM COATED ORAL at 20:40

## 2023-10-04 NOTE — BH NOTE
[facility]  PSYCHIATRIC PROGRESS NOTE    Patient: Criselda Herrera MRN: [id number]  SSN: [id number]    YOB: 1985  Age: 41 y.o. Sex: female      Admit Date: 9/30/2023    Length of Stay: 3 Days    Chief Complaint: \"I want to hurt people who sound like you. \"     Interval History:   10/4/23- Gisela Baldwin continues to exhibit significant irritability. She states she is \"fine\" today in a sarcastic and frustrated tone. She denies SI/HI/AVH. She sits with her arms and legs crossed. The pt is agreeable to [facility]. When leaving the treatment team room, she said \"fuck all of you. \"     10/3/23- Gisela Baldwin is exhibiting notable irritability today. She appears angry at all of my questions, talking in a sarcastic voice, interrupting virtually every statement made by the treatment team. It was virtually impossible to make a complete sentence without the pt interrupting and laughing. She speaks in a very high pitched voice. She denies SI. The pt exhibited notable frustration with me on multiple occasions; I attempted to elicit from the pt how we can help her while she's in the hospital, and her response was \"food, housing. \" I asked more specifically what her mental health goals are, and she stated \"I want to rebuild my character. \" I advised that this is a commendable goal, and is likely something that will be accomplished with intensive therapy at the outpatient level. The pt scoffed at this and accused me of trying to get rid of her. I advised that my goal is to understand how we can support her with her mental health needs while she is here in the hospital, and she laughed at this question and shook her head. She states she does not want to take medications at this time.  I inquired how else we can be of support to her in that case, since we are unable to provide patients with long-term housing or long-term individual therapy, and she said she would like a program that will provide long-term housing and mental

## 2023-10-04 NOTE — PROGRESS NOTES
Pharmacist Discharge Medication Reconciliation (anticipated date of discharge - 10/5/23)    Discharging Provider: Beto Abrams NP    Significant PMH:   Past Medical History:   Diagnosis Date    Gestational diabetes      Chief Complaint for this Admission:   Chief Complaint   Patient presents with    Mental Health Problem     Allergies: Patient has no known allergies. Discharge Medications:      Medication List        START taking these medications      amoxicillin-clavulanate 875-125 MG per tablet  Commonly known as: AUGMENTIN  Take 1 tablet by mouth every 12 hours for 4 doses               Where to Get Your Medications        These medications were sent to [address] [phone] [facility] [phone]  [address] [place] [address], [address] [place] [address]      Phone: [phone]   amoxicillin-clavulanate 875-125 MG per tablet       The patient's chart, MAR and AVS were reviewed by Albaro Desir RP.

## 2023-10-04 NOTE — PLAN OF CARE
Problem: Pain  Goal: Verbalizes/displays adequate comfort level or baseline comfort level  Flowsheets (Taken 10/3/2023 2235)  Verbalizes/displays adequate comfort level or baseline comfort level:   Assess pain using appropriate pain scale   Encourage patient to monitor pain and request assistance     Problem: Discharge Planning  Goal: Discharge to home or other facility with appropriate resources  Flowsheets (Taken 10/3/2023 2235)  Discharge to home or other facility with appropriate resources:   Identify barriers to discharge with patient and caregiver   Arrange for needed discharge resources and transportation as appropriate     Problem: Behavior  Goal: Pt/Family maintain appropriate behavior and adhere to behavioral management agreement, if implemented  Description: INTERVENTIONS:  1. Assess patient/family's coping skills and  non-compliant behavior (including use of illegal substances)  2. Notify security of behavior or suspected illegal substances which indicate the need for search of the family and/or belongings  3. Encourage verbalization of thoughts and concerns in a socially appropriate manner  4. Utilize positive, consistent limit setting strategies supporting safety of patient, staff and others  5. Encourage participation in the decision making process about the behavioral management agreement  6. If a visitor's behavior poses a threat to safety call refer to organization policy.   7. Initiate consult with , Psychosocial CNS, Spiritual Care as appropriate  10/3/2023 7442 by Gege Petit RN  Outcome: Not Progressing

## 2023-10-04 NOTE — INTERDISCIPLINARY ROUNDS
Behavioral Health Interdisciplinary Rounds     Patient Name: Nanci Abdi  Age: 41 y.o. Room/Bed:  736/02  Primary Diagnosis: Paranoid schizophrenia ([address] )   Admission Status: TDO     Readmission within 30 days: no  Power of  in place: no  Patient requires a blocked bed:  yes           Reason for blocked bed: behavior      Flu Vaccine :    Consults:          Labs/Testing due today? Have they refused labs?:     Sleep hours: 7+       Participation in Care/Groups:    Medication Compliant?:  yes  PRNS (last 24 hours):  no     Restraints (last 24 hours):  no     CIWA (range last 24 hours):     COWS (range last 24 hours):      Alcohol screening (AUDIT) completed -         If applicable, date SBIRT discussed in treatment team AND documented:   AUDIT Screen Score:        Document Brief Intervention (corresponds directly with the 5 A's, Ask, Advise, Assess, Assist, and Arrange): At- Risk Patients (Score 7-15 for women; 8-15 for men)  Discuss concern patient is drinking at unhealthy levels known to increase risk of alcohol-related health problems. Is Patient ready to commit to change? If No:  Encourage reflection  Discuss short term and long term health risks of consuming alcohol  Barriers to change  Reaffirm willingness to help / Educational materials provided  If Yes:  Set goal  Plan  Educational materials provided    Harmful use or Dependence (Score 16 or greater)  Discuss short term and long term health risks of consuming alcohol  Recommendations  Negotiate drinking goal  Recommend addiction specialist/center  Arrange follow-up appointments.     Tobacco - patient is a smoker:    Illegal Drugs use:      24 hour chart check complete:    ____________________________________________________________________________________________________________    Patient goal(s) for today:   Treatment team focus/goals:   Progress note: Patient met with treatment team and appeared with an irritable and angry mood and affect, mumbled under her breath \"fuck all y'all\". Patient reports feeling fine today, reports fair sleep, denies SI/HI and [place] at this time. Patient agreeable to [facility] as discharge plan, plan to discharge tomorrow morning.     LOS:  3  Expected LOS: 4    Financial concerns/prescription coverage:    Family contact:       Family requesting physician contact today:    Discharge plan:   Access to weapons :         Outpatient provider(s):     Participating treatment team members: Joby Lopez MSW, [place] RN, Darrell Mauricio, NALLELY, Araceli Jones PharmD

## 2023-10-04 NOTE — DISCHARGE INSTRUCTIONS
DISCHARGE SUMMARY    Andreia Pimentel  : 1985  MRN: [id number]    The patient Chelsea Laurent exhibits the ability to control behavior in a less restrictive environment. Patient's level of functioning is improving. No assaultive/destructive behavior has been observed for the past 24 hours. No suicidal/homicidal threat or behavior has been observed for the past 24 hours. There is no evidence of serious medication side effects. Patient has not been in physical or protective restraints for at least the past 24 hours. If weapons involved, how are they secured? None    Is patient aware of and in agreement with discharge plan? Yes    Arrangements for medication:  Prescriptions filled through [address] [place] [address], 30 day supply provided    Copy of discharge instructions to provider?:  Yes    Arrangements for transportation home: Lyft to [facility]    Keep all follow up appointments as scheduled, continue to take prescribed medications per physician instructions.   Mental health crisis number:  054 or your local mental health crisis line number at [facility] at [address] [facility] WARM LINE      8-280-750-MHAV ([phone])      M-F: 9am to 9pm      Sat & Sun: 5pm - 9pm  National suicide prevention lines:                             7-208-REHCJOB (5-364-036-549-248-7142)       2-101-661-TALK ([phone])    Crisis Text Line:  Text HOME to 163902

## 2023-10-04 NOTE — PLAN OF CARE
Problem: Coping  Goal: Pt/Family able to verbalize concerns and demonstrate effective coping strategies  Description: INTERVENTIONS:  1. Assist patient/family to identify coping skills, available support systems and cultural and spiritual values  2. Provide emotional support, including active listening and acknowledgement of concerns of patient and caregivers  3. Reduce environmental stimuli, as able  4. Instruct patient/family in relaxation techniques, as appropriate  5. Assess for spiritual pain/suffering and initiate Spiritual Care, Psychosocial Clinical Specialist consults as needed  Outcome: Not Progressing  Flowsheets (Taken 10/4/2023 0715)  Patient/family able to verbalize anxieties, fears, and concerns, and demonstrate effective coping: Provide emotional support, including active listening and acknowledgement of concerns of patient and caregivers     Problem: Behavior  Goal: Pt/Family maintain appropriate behavior and adhere to behavioral management agreement, if implemented  Description: INTERVENTIONS:  1. Assess patient/family's coping skills and  non-compliant behavior (including use of illegal substances)  2. Notify security of behavior or suspected illegal substances which indicate the need for search of the family and/or belongings  3. Encourage verbalization of thoughts and concerns in a socially appropriate manner  4. Utilize positive, consistent limit setting strategies supporting safety of patient, staff and others  5. Encourage participation in the decision making process about the behavioral management agreement  6. If a visitor's behavior poses a threat to safety call refer to organization policy. 7. Initiate consult with , Psychosocial CNS, Spiritual Care as appropriate  26/2/5348 8010 by Simon Mariscal, RN  Outcome: Not Progressing  10/3/2023 1742 by Ney Bell, RN  Outcome: Not Progressing    Patient is labile, irritable.  Accusatory towards staff \"of mistreating\" me, \"I don't like the way you asked me if I wanted ginger ale\"  Upon medication administration, \"l'll be there when I get there\"  Upon 5 steps of safe medication administration patient \"you don't need my birthdate\" \"you have it\" agree that I have it but it is a requirement of medication administration safety. States birthdate. Takes medication as prescribed.

## 2023-10-04 NOTE — PLAN OF CARE
Problem: Discharge Planning  Goal: Discharge to home or other facility with appropriate resources  Outcome: Progressing    Problem: Pain  Goal: Verbalizes/displays adequate comfort level or baseline comfort level  Outcome: Progressing    Problem: Anxiety  Goal: Will report anxiety at manageable levels  Description: INTERVENTIONS:  1. Administer medication as ordered  2. Teach and rehearse alternative coping skills  3. Provide emotional support with 1:1 interaction with staff  Outcome: Progressing  Will report anxiety at manageable levels: Provide emotional support with 1:1 interaction with staff     Problem: Coping  Goal: Pt/Family able to verbalize concerns and demonstrate effective coping strategies  Description: INTERVENTIONS:  1. Assist patient/family to identify coping skills, available support systems and cultural and spiritual values  2. Provide emotional support, including active listening and acknowledgement of concerns of patient and caregivers  3. Reduce environmental stimuli, as able  4. Instruct patient/family in relaxation techniques, as appropriate  5. Assess for spiritual pain/suffering and initiate Spiritual Care, Psychosocial Clinical Specialist consults as needed  Outcome: Not Progressing  Patient/family able to verbalize anxieties, fears, and concerns, and demonstrate effective coping: Provide emotional support, including active listening and acknowledgement of concerns of patient and caregivers     Problem: Decision Making  Goal: Pt/Family able to effectively weigh alternatives and participate in decision making related to treatment and care  Description: INTERVENTIONS:  1. Determine when there are differences between patient's view, family's view, and healthcare provider's view of condition  2. Facilitate patient and family articulation of goals for care  3. Help patient and family identify pros/cons of alternative solutions  4. Provide information as requested by patient/family  5.  Respect patient/family right to receive or not to receive information  6. Serve as a liaison between patient and family and health care team  7. Initiate Consults from Ethics, Palliative Care or initiate [address] as is appropriate  Outcome: Not Progressing     Problem: Behavior  Goal: Pt/Family maintain appropriate behavior and adhere to behavioral management agreement, if implemented  Description: INTERVENTIONS:  1. Assess patient/family's coping skills and  non-compliant behavior (including use of illegal substances)  2. Notify security of behavior or suspected illegal substances which indicate the need for search of the family and/or belongings  3. Encourage verbalization of thoughts and concerns in a socially appropriate manner  4. Utilize positive, consistent limit setting strategies supporting safety of patient, staff and others  5. Encourage participation in the decision making process about the behavioral management agreement  6. If a visitor's behavior poses a threat to safety call refer to organization policy. 7. Initiate consult with , Psychosocial CNS, Spiritual Care as appropriate  Outcome: Progressing       Problem: Depression/Self Harm  Goal: Effect of psychiatric condition will be minimized and patient will be protected from self harm  Description: INTERVENTIONS:  1. Assess impact of patient's symptoms on level of functioning, self care needs and offer support as indicated  2. Assess patient/family knowledge of depression, impact on illness and need for teaching  3. Provide emotional support, presence and reassurance  4. Assess for possible suicidal thoughts or ideation.  If patient expresses suicidal thoughts or statements do not leave alone, initiate Suicide Precautions, move to a room close to the nursing station and obtain sitter  Outcome: Progressing  Effect of psychiatric condition will be minimized and patient will be protected from self harm: Provide emotional support,

## 2023-10-04 NOTE — BH NOTE
GROUP THERAPY PROGRESS NOTE    Patient did not participate in Self-care group.     Blessing ROLLINS, [facility]-A

## 2023-10-04 NOTE — BH NOTE
GROUP THERAPY PROGRESS NOTE  Topic: Irrational beliefs  No group due to low patient participation or acuity. SW provided handouts for pts to work on independently.   AYO Beckham, [facility]-A

## 2023-10-05 VITALS
OXYGEN SATURATION: 100 % | RESPIRATION RATE: 16 BRPM | TEMPERATURE: 98.2 F | HEART RATE: 83 BPM | DIASTOLIC BLOOD PRESSURE: 86 MMHG | SYSTOLIC BLOOD PRESSURE: 122 MMHG | BODY MASS INDEX: 24.55 KG/M2 | HEIGHT: 61 IN | WEIGHT: 130 LBS

## 2023-10-05 PROCEDURE — 6370000000 HC RX 637 (ALT 250 FOR IP): Performed by: NURSE PRACTITIONER

## 2023-10-05 RX ADMIN — AMOXICILLIN AND CLAVULANATE POTASSIUM 1 TABLET: 875; 125 TABLET, FILM COATED ORAL at 08:56

## 2023-10-05 NOTE — DISCHARGE SUMMARY
DISCHARGE SUMMARY  Some parts of the discharge summary are from the initial Psychiatric interview that was done on admission by the admitting psychiatrist.     Name: Mario Roberts  MR#: [id number]  Account#: [de-identified]  : 1985  Date of Admission: 2023    Date of Discharge: 10/5/2023     TYPE OF DISCHARGE:   REGULAR     INITIAL PSYCHIATRIC INTERVIEW:   CHIEF COMPLAINT: \"I don't feel well. \"      HISTORY OF PRESENTING COMPLAINT:  This is a 41 y.o. female who is currently admitted to the acute psychiatric floor at [facility] on a voluntary basis for suicidal ideation in the context of altered mental status. She has reported auditory hallucinations, command in nature, increased confusion, and paranoia. She has been sleeping and eating erratically. During evaluation, the pt was resting in her bed with the covers fully over her head. She exhibited irritability and refused treatment team. We came to her, and she stated she didn't feel well, and would not respond to any further questions, even about medications or substance use. Information below comes from chart review, will attempt to obtain more information and collateral tomorrow. It was documented from the ED that the pt is not currently on medications. Of note, the pt is currently pregnant, apparently in the first trimester. PAST PSYCHIATRIC HISTORY: The pt has a hx of paranoid schizophrenia. The pt has a hx of one siucide attempt about 10 years ago as well as self-injurious behavior by cutting. PAST MEDICATION TRIALS: Depakote, Wellbutrin, Latuda, Zyprexa, trazodone, Abilify Maintena, Seroquel, Ativan, Prolixin, lithium, Risperdal, Haldol, Suboxone     SUBSTANCE ABUSE HISTORY:  The pt refused to disclose any substance abuse hx. From the chart, the pt's urine was positive for [facility] and there is a hx of taking suboxone, last filled in 2022. PSYCHOSOCIAL HISTORY: The pt has 2 children, ages 8 and 6 months.       FAMILY HISTORY: poor  Cognition is grossly intact    PROGNOSIS:   Guarded based on nature of patient's pathology/ies and treatment compliance issues. Prognosis is greatly dependent upon patient's motivation and ability to follow up on psychiatric/psychotherapy appointments as well as to comply with psychiatric medications as prescribed.

## 2023-10-05 NOTE — BH NOTE
Behavioral Health Interdisciplinary Rounds     Patient Name: Lluvia Gorman  Age: 41 y.o.   Room/Bed:  736/02  Primary Diagnosis: Paranoid schizophrenia ([address])   Admission Status: Voluntary    Readmission within 30 days: No  Power of  in place: No  Patient requires a blocked bed: Yes          Reason for blocked bed: Behavior  Sleep hours:        Participation in Care/Groups:  No  Medication Compliant?: Yes  PRNS (last 24 hours): None   Restraints (last 24 hours):  No  __________________________________________________  OQ Admission Analysis Survey completed:  OQ Admission Analysis Survey score:  __________________________________________________     Alcohol screening (AUDIT) completed -     If applicable, date SBIRT discussed in treatment team AND documented:    Tobacco - patient is a smoker:    Illegal Drugs use:      24 hour chart check complete: Yes    _______________________________________________    Patient goal(s) for today:   Treatment team focus/goals:   Progress note:      Spiritual Care Consult:   Financial concerns/prescription coverage:    Family contact:                        Family requesting physician contact today:    Discharge plan:   Access to weapons :                                                              Outpatient provider(s):   Patient's preferred phone number for follow up call :   Patient's preferred e-mail address :    LOS:  4  Expected LOS:     Participating treatment team members: Lluvia Gorman, * (assigned SW),

## 2023-10-05 NOTE — BH NOTE
Behavioral Health Transition Record to Provider    Patient Name: Keegan Swanson  YOB: 1985  Medical Record Number: [id number]  Date of Admission: 9/30/2023  Date of Discharge: 10/5/2023    Attending Provider: Katie Garcia MD  Discharging Provider: Terell Vivas NP    To contact this individual call [phone] and ask the  to page. If unavailable, ask to be transferred to [facility] Provider on call. [facility] Provider will be available on call 24/7 and during holidays. Primary Care Provider: No primary care provider on file. No Known Allergies    Reason for Admission: CHIEF COMPLAINT: \"I don't feel well. \"      HISTORY OF PRESENTING COMPLAINT:  This is a 41 y.o. female who is currently admitted to the acute psychiatric floor at [facility] on a voluntary basis for suicidal ideation in the context of altered mental status. She has reported auditory hallucinations, command in nature, increased confusion, and paranoia. She has been sleeping and eating erratically. During evaluation, the pt was resting in her bed with the covers fully over her head. She exhibited irritability and refused treatment team. We came to her, and she stated she didn't feel well, and would not respond to any further questions, even about medications or substance use. Information below comes from chart review, will attempt to obtain more information and collateral tomorrow. It was documented from the ED that the pt is not currently on medications. Of note, the pt is currently pregnant, apparently in the first trimester.      Admission Diagnosis: Paranoid schizophrenia ([address] ) [F20.0]  Acute cystitis without hematuria [N30.00]  Schizophrenia, unspecified type ([address]) [F20.9]    * No surgery found *    Results for orders placed or performed during the hospital encounter of 09/30/23   COVID Only [facility])    Specimen: Nasopharyngeal   Result Value Ref Range    Source Nasopharyngeal

## 2023-10-05 NOTE — PLAN OF CARE
Problem: Discharge Planning  Goal: Discharge to home or other facility with appropriate resources  Outcome: Adequate for Discharge   PLAN IS TO DISCHARGE TO HEALING PLACE. TRANSPORTATION TO BE PROVIDED BY [facility]. PATIENT GIVEN ALL BELONGINGS AND 1 FILLED PRESCRIPTION.

## 2023-10-05 NOTE — PLAN OF CARE
Problem: Safety - Adult  Goal: Free from fall injury  Outcome: Progressing   Patient received resting in bed with eyes closed. NAD and no complaints noted. Safety measures in place. Will continue to monitor with q15min rounds.

## 2023-10-06 ENCOUNTER — HOSPITAL ENCOUNTER (EMERGENCY)
Facility: HOSPITAL | Age: 38
Discharge: HOME | End: 2023-10-08
Attending: STUDENT IN AN ORGANIZED HEALTH CARE EDUCATION/TRAINING PROGRAM
Payer: MEDICARE

## 2023-10-06 ENCOUNTER — APPOINTMENT (OUTPATIENT)
Facility: HOSPITAL | Age: 38
End: 2023-10-06
Payer: MEDICARE

## 2023-10-06 DIAGNOSIS — R45.851 SUICIDAL IDEATIONS: ICD-10-CM

## 2023-10-06 DIAGNOSIS — R07.9 CHEST PAIN, UNSPECIFIED TYPE: Primary | ICD-10-CM

## 2023-10-06 LAB
AMPHET UR QL SCN: NEGATIVE
ANION GAP SERPL CALC-SCNC: 4 MMOL/L (ref 3–18)
BARBITURATES UR QL SCN: NEGATIVE
BASOPHILS # BLD: 0 K/UL (ref 0–0.1)
BASOPHILS NFR BLD: 0 % (ref 0–2)
BENZODIAZ UR QL: NEGATIVE
BUN SERPL-MCNC: 6 MG/DL (ref 7–18)
BUN/CREAT SERPL: 10 (ref 12–20)
CALCIUM SERPL-MCNC: 9.3 MG/DL (ref 8.5–10.1)
CANNABINOIDS UR QL SCN: NEGATIVE
CHLORIDE SERPL-SCNC: 107 MMOL/L (ref 100–111)
CO2 SERPL-SCNC: 26 MMOL/L (ref 21–32)
COCAINE UR QL SCN: NEGATIVE
CREAT SERPL-MCNC: 0.61 MG/DL (ref 0.6–1.3)
DIFFERENTIAL METHOD BLD: ABNORMAL
EOSINOPHIL # BLD: 0.2 K/UL (ref 0–0.4)
EOSINOPHIL NFR BLD: 3 % (ref 0–5)
ERYTHROCYTE [DISTWIDTH] IN BLOOD BY AUTOMATED COUNT: 12.8 % (ref 11.6–14.5)
ETHANOL SERPL-MCNC: <3 MG/DL (ref 0–3)
FLUAV RNA SPEC QL NAA+PROBE: NOT DETECTED
FLUBV RNA SPEC QL NAA+PROBE: NOT DETECTED
GLUCOSE SERPL-MCNC: 112 MG/DL (ref 74–99)
HCT VFR BLD AUTO: 36.8 % (ref 35–45)
HGB BLD-MCNC: 11.9 G/DL (ref 12–16)
IMM GRANULOCYTES # BLD AUTO: 0 K/UL (ref 0–0.04)
IMM GRANULOCYTES NFR BLD AUTO: 0 % (ref 0–0.5)
LYMPHOCYTES # BLD: 2.1 K/UL (ref 0.9–3.6)
LYMPHOCYTES NFR BLD: 25 % (ref 21–52)
Lab: NORMAL
MCH RBC QN AUTO: 26.4 PG (ref 24–34)
MCHC RBC AUTO-ENTMCNC: 32.3 G/DL (ref 31–37)
MCV RBC AUTO: 81.6 FL (ref 78–100)
MONOCYTES # BLD: 0.6 K/UL (ref 0.05–1.2)
MONOCYTES NFR BLD: 8 % (ref 3–10)
NEUTS SEG # BLD: 5.4 K/UL (ref 1.8–8)
NEUTS SEG NFR BLD: 65 % (ref 40–73)
NRBC # BLD: 0 K/UL (ref 0–0.01)
NRBC BLD-RTO: 0 PER 100 WBC
OPIATES UR QL: NEGATIVE
PCP UR QL: NEGATIVE
PLATELET # BLD AUTO: 267 K/UL (ref 135–420)
PMV BLD AUTO: 10.2 FL (ref 9.2–11.8)
POTASSIUM SERPL-SCNC: 3.8 MMOL/L (ref 3.5–5.5)
RBC # BLD AUTO: 4.51 M/UL (ref 4.2–5.3)
SARS-COV-2 RNA RESP QL NAA+PROBE: NOT DETECTED
SODIUM SERPL-SCNC: 137 MMOL/L (ref 136–145)
TROPONIN I SERPL HS-MCNC: 3 NG/L (ref 0–54)
WBC # BLD AUTO: 8.3 K/UL (ref 4.6–13.2)

## 2023-10-06 PROCEDURE — 82077 ASSAY SPEC XCP UR&BREATH IA: CPT

## 2023-10-06 PROCEDURE — 84484 ASSAY OF TROPONIN QUANT: CPT

## 2023-10-06 PROCEDURE — 71045 X-RAY EXAM CHEST 1 VIEW: CPT

## 2023-10-06 PROCEDURE — 93005 ELECTROCARDIOGRAM TRACING: CPT | Performed by: STUDENT IN AN ORGANIZED HEALTH CARE EDUCATION/TRAINING PROGRAM

## 2023-10-06 PROCEDURE — 85025 COMPLETE CBC W/AUTO DIFF WBC: CPT

## 2023-10-06 PROCEDURE — 80307 DRUG TEST PRSMV CHEM ANLYZR: CPT

## 2023-10-06 PROCEDURE — 99285 EMERGENCY DEPT VISIT HI MDM: CPT

## 2023-10-06 PROCEDURE — 80048 BASIC METABOLIC PNL TOTAL CA: CPT

## 2023-10-06 PROCEDURE — 87636 SARSCOV2 & INF A&B AMP PRB: CPT

## 2023-10-06 NOTE — ED NOTES
Pt given paper scrubs and red socks per Mental health protocol and given warm blanket for comfort. Pt states no other needs at this time, Nurse made aware of pt current status.       Edd Chiu  10/06/23 3950

## 2023-10-06 NOTE — ED NOTES
1xA to BR. Tolerated dinner tray and drink per MD russell for PO.       Kaila Yuan RN  10/06/23 1521

## 2023-10-06 NOTE — ED TRIAGE NOTES
Pt in ED with c/o mental health problem, chest pain.  Pt SI with a plan to time travel Hx of schizophrenia

## 2023-10-07 LAB
EKG ATRIAL RATE: 80 BPM
EKG DIAGNOSIS: NORMAL
EKG P AXIS: 72 DEGREES
EKG P-R INTERVAL: 132 MS
EKG Q-T INTERVAL: 356 MS
EKG QRS DURATION: 70 MS
EKG QTC CALCULATION (BAZETT): 410 MS
EKG R AXIS: 14 DEGREES
EKG T AXIS: 43 DEGREES
EKG VENTRICULAR RATE: 80 BPM
HCG UR QL: POSITIVE

## 2023-10-07 PROCEDURE — 81025 URINE PREGNANCY TEST: CPT

## 2023-10-07 PROCEDURE — 93010 ELECTROCARDIOGRAM REPORT: CPT | Performed by: INTERNAL MEDICINE

## 2023-10-07 ASSESSMENT — ENCOUNTER SYMPTOMS
CHEST TIGHTNESS: 0
SHORTNESS OF BREATH: 0
VOMITING: 0
DIARRHEA: 0
NAUSEA: 0
ABDOMINAL PAIN: 0

## 2023-10-07 NOTE — BSMART NOTE
Crisis Note: On rounds, writer attempted crisis evaluation; however, patient is not interested in answering questions and became agitated, then walked out of the ED conference room. \"I'm not doing this! \" Crisis will interview patient when willing to participate with crisis evaluation.

## 2023-10-07 NOTE — BSMART NOTE
Crisis Note: Discussed with the on-call psychiatrist, Dr. Corry Ayala, who declined admission here due to the patient being pregnant and [facility] does not have a OBGYN provider on site. Crisis spoke with Priyank Alejandror, [address] [place] [address], [phone], initiate a bed search. Crisis will continue assist as needed. Spoke with RN charge, informed that patient needs HCG result.

## 2023-10-07 NOTE — BSMART NOTE
Behavioral Health Crisis Assessment    Chief Complaint: Patient states she is \"physically ill & dealing with depression\"      Voluntary or Involuntary Status: voluntarily. C-SSRS current suicide Risk (High, Moderate, Low): Low. Past Suicide Attempts (specify):  No.      Self Injurious/Self Mutilation behaviors (specify): Patient denies. Protective Factors (specify): Patient denies. Risk Factors (specify): Schizophrenia and Bipolar Disorder. Substance use (current or past): Patient denies and refused to answer. MH & Substance use Treatment  (current and/or past): Patient reports yes to mental health. Patient reports \"I forgot\", regarding treatment. Patient's most recent past inpatient psychiatric is [address] [place] [address]. Violence towards others (current and/or past:(specify): Patient denies. Legal issues (current or past): Patient denies. Access to weapons: Patient denies. Trauma or Abuse (specify): Patient denies. Living Situation: \"I have no where to live\". Employment: disability. Education level: Patient refused to answer. ADLs: Patient refused to answer. Mental Status Exam: Patient laying on ER bed. Patient initially agreed to engage in assessment. Patient is wearing blue hospital scrubs. Patient had poor eye contact and was uncooperative. Patient presented Depressed, Irritable, and Suicidal mood. Thought process was Illogical and Thought blocking with suicidal ideation content. Patient's speech was clear with irritated tone. Judgement and insight are fair. Brief Clinical Summary: Patient is a 41years-old pregnant female [facility] to suicidal ideation and depression. States that she is \"physically ill and depressed\". Patient endorses suicidal ideation. Patient denied homicidal ideations.  Denied any history of

## 2023-10-07 NOTE — ED NOTES
Pt resting comfortably on stretcher w/ eyes closed. NAD noted.       Hitesh Sauceda RN  10/07/23 9970

## 2023-10-07 NOTE — ED NOTES
Received report from Marie tristen, [place]. Assumed care of patient. Patient requested breakfast tray. Gave the patient juice and a sandwich. Patient is resting quietly.       Ottoniel Shearer RN  10/07/23 0194

## 2023-10-07 NOTE — PROGRESS NOTES
6:08 AM : Pt care transferred to me from Dr. Darion Carrera  ,ED provider. History of patient complaint(s), available diagnostic reports and current treatment plan has been discussed thoroughly. Bedside rounding on patient occured : No .  Intended disposition of patient : TBD  Pending diagnostics reports and/or labs (please list):   SI with plan, hx of Schizophrenia, Crisis screener to see  ED Course as of 10/07/23 1938   Sat Oct 07, 2023   0614 Medically cleared for CP, then stated SI, Urine preg pending []   0615 Potassium: 3.8 [JM]   1804 Bed search in process due to current pregnancy [JM]      ED Course User Index  [] Samuel Caraballo DO     Patient signed out to Dr. Darion Carrera at shift change. Samuel Caraballo DO  Emergency Physician  .S.  Ac[facility] [place]

## 2023-10-08 VITALS
HEART RATE: 72 BPM | TEMPERATURE: 98 F | OXYGEN SATURATION: 100 % | RESPIRATION RATE: 17 BRPM | WEIGHT: 130 LBS | SYSTOLIC BLOOD PRESSURE: 116 MMHG | DIASTOLIC BLOOD PRESSURE: 63 MMHG | BODY MASS INDEX: 24.55 KG/M2 | HEIGHT: 61 IN

## 2023-10-08 PROCEDURE — 99284 EMERGENCY DEPT VISIT MOD MDM: CPT | Performed by: PSYCHIATRY & NEUROLOGY

## 2023-10-08 ASSESSMENT — PAIN - FUNCTIONAL ASSESSMENT: PAIN_FUNCTIONAL_ASSESSMENT: NONE - DENIES PAIN

## 2023-10-08 NOTE — ED NOTES
Spoke with Loc Cast in crisis-will come down to speak with pt.      Jacklyn Ren, QUIN  10/08/23 6170

## 2023-10-08 NOTE — BSMART NOTE
Crisis Note: Spoke with charge nurse regarding patient's disposition. Crisis talked with patient about [facility] [address] ([place]) and their services. Patient agreed that she would like for crisis to look into the agency for assistance. Spoke with Ericka Thurston, [phone], informed that they will call crisis back. ED charge nurse and physician made aware.

## 2023-10-08 NOTE — ED NOTES
Crisis returned call-pt spoke with nurse at [facility] did not qualify for placement there so intake nurse gave alternate number to assist in finding placement at a shelter.   Shelter is open only RICO Murillo RN  10/08/23 3478

## 2023-10-08 NOTE — ED NOTES
Phone to [facility] nurses line is [phone] to speak with pt regarding placement.        Frances Michel RN  10/08/23 1842

## 2023-10-08 NOTE — ED NOTES
In to assist with discharge of pt-pt refusing to leave states she wants to talk to her doctor. Explained to pt she was discharged and no longer under an assigned physician-asked pt which MD she would like to speak with-psych or ER MD.    Pt very rude to this nurse, refusing to let this nurse speak and then stating she would not speak to me. Educated pt on being d//c and attempting to help her. Pt states she is not SI but has \"no where to go and they were supposed to get her admitted not put her out. \"    Call to case management/crisis to see if there are alternate resources to help find pt. A place to stay.          So Soria RN  10/08/23 5962

## 2023-10-08 NOTE — ED PROVIDER NOTES
ED continued care note    6:51 AM EDT  Signed out to me by Dr. Kaylin Su at 6:30 AM shift change, patient with suicidal ideation, incidentally found to be pregnant, awaiting placement. Psychiatric consult recommended for today so this is ordered. Reviewed at 6:51 AM EDT  No data found. ED Course as of 10/08/23 0651   Sat Oct 07, 2023   0614 Medically cleared for CP, then stated SI, Urine preg pending []   0615 Potassium: 3.8 []   1804 Bed search in process due to current pregnancy []      ED Course User Index  [] Lorelei Lofton DO     2:07 PM EDT  Seen by Dr Rui Sykes, denies suicidality. Goal-directed behavior. Suitable for discharge. Clinical Impression:   1. Chest pain, unspecified type    2.  Suicidal ideations         Nicole Galaviz MD  10/08/23 3382
No acute cardiopulmonary process. Medical Decision Making   I am the first provider for this patient. I reviewed the vital signs, available nursing notes, past medical history, past surgical history, family history and social history. Vital Signs-Reviewed the patient's vital signs. EKG: All EKG's are interpreted by the Emergency Department Physician who either signs or Co-signs this chart in the absence of a cardiologist.    Normal sinus rhythm. No STEMI. As interpreted by me. Interpretation per the Radiologist below, if available at the time of this note:    ED Course: Progress Notes, Reevaluation, and Consults:    Provider Notes (Medical Decision Making):       MDM  Number of Diagnoses or Management Options  Diagnosis management comments: Patient appears well and in no acute distress. Vital signs stable. Overall benign physical exam.  Normal heart auscultation. Clear lungs to auscultation. Screening labs to rule out ACS obtained. Troponin is negative. EKG is nondiagnostic. No acute findings on chest x-ray. Remainder of her blood work is grossly unremarkable. She is medically cleared. Will have crisis evaluate patient for suicidal ideation. Patient care signed out to Dr. Dom Vickers at end of shift. Procedures          Diagnosis     Clinical Impression:   1. Chest pain, unspecified type    2. Suicidal ideations        Disposition: TBD    Disclaimer: Sections of this note are dictated using utilizing voice recognition software. Minor typographical errors may be present. If questions arise, please do not hesitate to contact me or call our department.          Aishwarya Romero DO  10/07/23 3840

## 2023-10-08 NOTE — ED NOTES
Case management Linette Lacy came and gave the list and numbers of shelters to the patient. Patient acknowledged and agreed to leave. Patient denies suicidal ideation prior discharge.      Gregorio Mckeon, RN  10/08/23 [facility][1], [facility] Dontrell Shepherd RN  10/08/23 3264

## 2023-10-08 NOTE — BSMART NOTE
Crisis Note: Spoke with Dr. Blanca Turner who informed crisis that he will be placing a psych consult for the on-call psychiatrist to see patient who has been in the ED for over 24 hours.

## 2023-10-08 NOTE — ED NOTES
Assumed care of this patient. Patient is awake, lying on bed comfortably. Patient answers close ended questions; apathetic attitude, with poor eye contact. Patient said she is fine but covered herself with her blanked and said \"I don't know\" when asked how she would describe fine.      Gregorio Mckeon RN  10/08/23 0689

## 2023-10-08 NOTE — ED NOTES
Pt sleeping in bed. Even and unlabored RR and chest rise. NAD noted. Lights dimmed for pt comfort.       Shelia Valencia, [place]  10/07/23 2131

## 2023-10-08 NOTE — BSMART NOTE
Crisis Note: Patient reassessed for suicidal ideations. Patient continues to endorse suicidal ideations without plan. Patient denies homicidal ideations and AV hallucinations. Patient inquiring about placement. Writer informed patient a bed search is in progress. Disposition:[facility] to conducting bed search.

## 2023-10-08 NOTE — BSMART NOTE
Crisis Note: Spoke with Javad Roldan, [address] [facility], [phone], regarding possible placement and asked if he could speak with the patient. Cruz attempted to get in contact with charge nurse; however, she was unavailable at the time. Cruz asked Kole if it was okay to call him back on this number and he stated, \"Yes\". [facility] contacted charge nurse again and informed that the nurse from [address] would like to speak with the patient by providing [phone]. ED charge informed that she will be calling the nurse so the patient can speak with him. Awaiting disposition.

## 2023-10-08 NOTE — BSMART NOTE
Crisis Note: On rounds, patient reassessed for suicidal ideations. Patient reported that she is depressed, but she does not want to kill herself. Psych consult in progress. Sitter at bedside. Crisis will assist as needed.

## 2023-10-08 NOTE — ED NOTES
Case management in to see pt-to assist pt with finding placement. Pt very rude with case management. Pt states \"what do you want? \"     Pt states \"just give me my papers. \"      Pt given d/c paperwork, belongings, and list of shelters.     Pt ambulatory to exit upon d/c     Sri Alston RN  10/08/23 8788

## 2023-10-08 NOTE — ED NOTES
Assumed care of patient. Patient resting comfortably. Sitter at bedside.      [place]., RN  10/08/23 2867

## 2023-10-08 NOTE — ED NOTES
Patient is for discharge. The writer was about to discuss discharge instructions but patient said \"I have nowhere to go and I want to have in-patient treatment\". Informed Dr. Ruthy Angel.      Wero Page RN  10/08/23 1501